# Patient Record
Sex: FEMALE | Race: WHITE | HISPANIC OR LATINO | Employment: OTHER | URBAN - NONMETROPOLITAN AREA
[De-identification: names, ages, dates, MRNs, and addresses within clinical notes are randomized per-mention and may not be internally consistent; named-entity substitution may affect disease eponyms.]

---

## 2017-12-21 ENCOUNTER — OFFICE VISIT (OUTPATIENT)
Dept: CARDIOLOGY | Facility: CLINIC | Age: 77
End: 2017-12-21
Payer: MEDICARE

## 2017-12-21 VITALS
OXYGEN SATURATION: 94 % | BODY MASS INDEX: 26.5 KG/M2 | SYSTOLIC BLOOD PRESSURE: 130 MMHG | WEIGHT: 135 LBS | DIASTOLIC BLOOD PRESSURE: 70 MMHG | HEART RATE: 74 BPM | HEIGHT: 60 IN

## 2017-12-21 DIAGNOSIS — R94.39 ABNORMAL STRESS ECG WITH TREADMILL: ICD-10-CM

## 2017-12-21 DIAGNOSIS — R07.89 OTHER CHEST PAIN: ICD-10-CM

## 2017-12-21 DIAGNOSIS — I87.2 VENOUS INSUFFICIENCY OF BOTH LOWER EXTREMITIES: Chronic | ICD-10-CM

## 2017-12-21 LAB — EKG IMPRESSION: NORMAL

## 2017-12-21 PROCEDURE — 93000 ELECTROCARDIOGRAM COMPLETE: CPT | Performed by: INTERNAL MEDICINE

## 2017-12-21 PROCEDURE — 99203 OFFICE O/P NEW LOW 30 MIN: CPT | Mod: 25 | Performed by: INTERNAL MEDICINE

## 2017-12-21 ASSESSMENT — ENCOUNTER SYMPTOMS
FALLS: 0
WEAKNESS: 0
PALPITATIONS: 0
FOCAL WEAKNESS: 0
SHORTNESS OF BREATH: 0
PND: 0
COUGH: 0
ABDOMINAL PAIN: 0
BLURRED VISION: 0
DIZZINESS: 0
NAUSEA: 0
BRUISES/BLEEDS EASILY: 0
CHILLS: 0
SORE THROAT: 0
CLAUDICATION: 0
FEVER: 0

## 2017-12-21 NOTE — LETTER
[facility] [address],   [address]  [place] [address]  Phone: [phone]  Fax: [phone]              Ada Prieto  1940    Encounter Date: 12/21/2017    Conner Sykes M.D.          PROGRESS NOTE:  Subjective:   Ada Prieto is a 77 y.o. female who presents today In consultation from Ms. Pascual for evaluation of abnormal stress EEG done in the setting of atypical chest pain    She's been healthy for sometime she recently does did have a venous procedure for chronic varicose veins and was planning on possible ablation tomorrow although she's postpone that    She had been describing she says for years a left sided chest discomfort in the left shoulder without radiation not associated with anything particular including ambulation she had a stress ECG which showed equivocal for nonspecific ST changes with exercise      Past Medical History:   Diagnosis Date   • Venous insufficiency of both lower extremities      Past Surgical History:   Procedure Laterality Date   • HYSTERECTOMY, TOTAL ABDOMINAL       Family History   Problem Relation Age of Onset   • Anemia Mother      History   Smoking Status   • Former Smoker   Smokeless Tobacco   • Never Used     No Known Allergies  Outpatient Encounter Prescriptions as of 12/21/2017   Medication Sig Dispense Refill   • ESTRADIOL PO Take  by mouth.       No facility-administered encounter medications on file as of 12/21/2017.      Review of Systems   Constitutional: Negative for chills and fever.   HENT: Positive for hearing loss. Negative for sore throat.    Eyes: Negative for blurred vision.   Respiratory: Negative for cough and shortness of breath.    Cardiovascular: Negative for chest pain, palpitations, claudication, leg swelling and PND.   Gastrointestinal: Negative for abdominal pain and nausea.   Musculoskeletal: Negative for falls and joint pain.   Skin: Negative for rash.      Neurological: Negative for dizziness, focal weakness and weakness.   Endo/Heme/Allergies: Positive for environmental allergies. Does not bruise/bleed easily.        Objective:   /70   Pulse 74   Ht 1.524 m (5')   Wt 61.2 kg (135 lb)   SpO2 94%   BMI 26.37 kg/m²      Physical Exam   Constitutional: No distress.   HENT:   Mouth/Throat: Oropharynx is clear and moist.   Eyes: No scleral icterus.   Cardiovascular: Normal rate, regular rhythm and intact distal pulses.  Exam reveals no gallop and no friction rub.    No murmur heard.  Pulmonary/Chest: Effort normal and breath sounds normal. She has no rales.   Abdominal: Bowel sounds are normal. There is no tenderness.   Musculoskeletal: She exhibits no edema.   Neurological: She is alert.   Skin: No rash noted. She is not diaphoretic.   Psychiatric: She has a normal mood and affect.     Her labs are within normal range including lipids    I reviewed the tracings of her stress ECG which do show flat to upsloping ST depressions with exercise equivocal for ischemia    Assessment:     1. Other chest pain  EKG    ECHO-REST/STRESS W/O CONTRAST   2. Abnormal stress ECG with treadmill  ECHO-REST/STRESS W/O CONTRAST   3. Venous insufficiency of both lower extremities         Medical Decision Making:  Today's Assessment / Status / Plan:     It was my pleasure to meet with Ms. Prieto.    Her chest pains are atypical for angina and coronary disease given her abnormal stress ECG will be reasonable to get more sensitivity with a stress echo.    Her blood pressure was normal    Her lipids are normal    I will follow up with Ms. Prieto on the results of the testing over the phone.    Will determine further follow-up from there    It is my pleasure to participate in the care of Ms. Prieto.  Please do not hesitate to contact me with questions or concerns.    Conner Sykes MD PhD FAC  Cardiologist [facility]        Kristen Bhandari M.D.  [id number]  [facility] [place] [address]  VIA Facsimile: [phone]     Anurag Samaniego M.D.  [address] [facility] [address]  VIA Facsimile: [phone]

## 2017-12-22 NOTE — PROGRESS NOTES
Subjective:   Ada Prieto is a 77 y.o. female who presents today In consultation from Ms. Ankur for evaluation of abnormal stress EEG done in the setting of atypical chest pain    She's been healthy for sometime she recently does did have a venous procedure for chronic varicose veins and was planning on possible ablation tomorrow although she's postpone that    She had been describing she says for years a left sided chest discomfort in the left shoulder without radiation not associated with anything particular including ambulation she had a stress ECG which showed equivocal for nonspecific ST changes with exercise      Past Medical History:   Diagnosis Date   • Venous insufficiency of both lower extremities      Past Surgical History:   Procedure Laterality Date   • HYSTERECTOMY, TOTAL ABDOMINAL       Family History   Problem Relation Age of Onset   • Anemia Mother      History   Smoking Status   • Former Smoker   Smokeless Tobacco   • Never Used     No Known Allergies  Outpatient Encounter Prescriptions as of 12/21/2017   Medication Sig Dispense Refill   • ESTRADIOL PO Take  by mouth.       No facility-administered encounter medications on file as of 12/21/2017.      Review of Systems   Constitutional: Negative for chills and fever.   HENT: Positive for hearing loss. Negative for sore throat.    Eyes: Negative for blurred vision.   Respiratory: Negative for cough and shortness of breath.    Cardiovascular: Negative for chest pain, palpitations, claudication, leg swelling and PND.   Gastrointestinal: Negative for abdominal pain and nausea.   Musculoskeletal: Negative for falls and joint pain.   Skin: Negative for rash.   Neurological: Negative for dizziness, focal weakness and weakness.   Endo/Heme/Allergies: Positive for environmental allergies. Does not bruise/bleed easily.        Objective:   /70   Pulse 74   Ht 1.524 m (5')   Wt 61.2 kg (135 lb)   SpO2 94%   BMI 26.37 kg/m²     Physical Exam    Constitutional: No distress.   HENT:   Mouth/Throat: Oropharynx is clear and moist.   Eyes: No scleral icterus.   Cardiovascular: Normal rate, regular rhythm and intact distal pulses.  Exam reveals no gallop and no friction rub.    No murmur heard.  Pulmonary/Chest: Effort normal and breath sounds normal. She has no rales.   Abdominal: Bowel sounds are normal. There is no tenderness.   Musculoskeletal: She exhibits no edema.   Neurological: She is alert.   Skin: No rash noted. She is not diaphoretic.   Psychiatric: She has a normal mood and affect.     Her labs are within normal range including lipids    I reviewed the tracings of her stress ECG which do show flat to upsloping ST depressions with exercise equivocal for ischemia    Assessment:     1. Other chest pain  EKG    ECHO-REST/STRESS W/O CONTRAST   2. Abnormal stress ECG with treadmill  ECHO-REST/STRESS W/O CONTRAST   3. Venous insufficiency of both lower extremities         Medical Decision Making:  Today's Assessment / Status / Plan:     It was my pleasure to meet with Ms. Preito.    Her chest pains are atypical for angina and coronary disease given her abnormal stress ECG will be reasonable to get more sensitivity with a stress echo.    Her blood pressure was normal    Her lipids are normal    I will follow up with Ms. Prieto on the results of the testing over the phone.    Will determine further follow-up from there    It is my pleasure to participate in the care of Ms. Prieto.  Please do not hesitate to contact me with questions or concerns.    Conner Sykes MD PhD [facility]  Cardiologist [facility]